# Patient Record
Sex: FEMALE | Race: BLACK OR AFRICAN AMERICAN | NOT HISPANIC OR LATINO | URBAN - METROPOLITAN AREA
[De-identification: names, ages, dates, MRNs, and addresses within clinical notes are randomized per-mention and may not be internally consistent; named-entity substitution may affect disease eponyms.]

---

## 2021-03-25 ENCOUNTER — OFFICE VISIT (OUTPATIENT)
Dept: URBAN - METROPOLITAN AREA CLINIC 25 | Facility: CLINIC | Age: 31
End: 2021-03-25
Payer: MEDICAID

## 2021-03-25 ENCOUNTER — WEB ENCOUNTER (OUTPATIENT)
Dept: URBAN - METROPOLITAN AREA CLINIC 25 | Facility: CLINIC | Age: 31
End: 2021-03-25

## 2021-03-25 VITALS
DIASTOLIC BLOOD PRESSURE: 87 MMHG | HEART RATE: 72 BPM | WEIGHT: 133.6 LBS | TEMPERATURE: 97.5 F | HEIGHT: 67 IN | BODY MASS INDEX: 20.97 KG/M2 | SYSTOLIC BLOOD PRESSURE: 126 MMHG

## 2021-03-25 DIAGNOSIS — K92.0 HEMATEMESIS, PRESENCE OF NAUSEA NOT SPECIFIED: ICD-10-CM

## 2021-03-25 DIAGNOSIS — R10.30 LOWER ABDOMINAL PAIN: ICD-10-CM

## 2021-03-25 DIAGNOSIS — R11.2 INTRACTABLE VOMITING WITH NAUSEA, UNSPECIFIED VOMITING TYPE: ICD-10-CM

## 2021-03-25 PROCEDURE — 99204 OFFICE O/P NEW MOD 45 MIN: CPT | Performed by: INTERNAL MEDICINE

## 2021-03-25 RX ORDER — OMEPRAZOLE 40 MG/1
1 CAPSULE 30 MINUTES BEFORE MORNING MEAL CAPSULE, DELAYED RELEASE PELLETS ORAL ONCE A DAY
Qty: 90 | Refills: 4 | OUTPATIENT
Start: 2021-03-25

## 2021-03-25 RX ORDER — HYOSCYAMINE SULFATE 0.12 MG/1
TAKE 1 TABLET (0.125 MG) BY ORAL ROUTE 3 TIMES PER DAY FOR 30 DAYS TABLET ORAL
Qty: 90 | Refills: 4 | OUTPATIENT
Start: 2021-03-25 | End: 2021-08-22

## 2021-03-25 NOTE — HPI-TODAY'S VISIT:
Patient presents with c/o abdominal pain with n/v intermittently. Pt's ER visit in Feb at [facility] resulted in normal labs /x rays. Pt was discharged from the er. She has had constipation. Pt admits to intermittent heartburn. Pt denies hot showers and heating pad. Pt states sxs have been present for yrs intermittently without diagnosis. Pt admits to hematemesis.

## 2021-04-21 ENCOUNTER — OFFICE VISIT (OUTPATIENT)
Dept: URBAN - METROPOLITAN AREA SURGERY CENTER 20 | Facility: SURGERY CENTER | Age: 31
End: 2021-04-21
Payer: MEDICAID

## 2021-04-21 DIAGNOSIS — K92.0 BLOODY EMESIS: ICD-10-CM

## 2021-04-21 DIAGNOSIS — K31.89 ACQUIRED DEFORMITY OF DUODENUM: ICD-10-CM

## 2021-04-21 DIAGNOSIS — R11.2 ACUTE NAUSEA WITH NONBILIOUS VOMITING: ICD-10-CM

## 2021-04-21 PROCEDURE — 43235 EGD DIAGNOSTIC BRUSH WASH: CPT | Performed by: INTERNAL MEDICINE

## 2021-04-21 PROCEDURE — G8907 PT DOC NO EVENTS ON DISCHARG: HCPCS | Performed by: INTERNAL MEDICINE

## 2021-04-21 RX ORDER — OMEPRAZOLE 40 MG/1
1 CAPSULE 30 MINUTES BEFORE MORNING MEAL CAPSULE, DELAYED RELEASE PELLETS ORAL ONCE A DAY
Qty: 90 | Refills: 4 | Status: ACTIVE | COMMUNITY
Start: 2021-03-25

## 2021-04-21 RX ORDER — HYOSCYAMINE SULFATE 0.12 MG/1
TAKE 1 TABLET (0.125 MG) BY ORAL ROUTE 3 TIMES PER DAY FOR 30 DAYS TABLET ORAL
Qty: 90 | Refills: 4 | Status: ACTIVE | COMMUNITY
Start: 2021-03-25 | End: 2021-08-22

## 2021-05-24 ENCOUNTER — TELEPHONE ENCOUNTER (OUTPATIENT)
Dept: URBAN - METROPOLITAN AREA CLINIC 23 | Facility: CLINIC | Age: 31
End: 2021-05-24

## 2021-06-01 ENCOUNTER — OFFICE VISIT (OUTPATIENT)
Dept: URBAN - METROPOLITAN AREA CLINIC 92 | Facility: CLINIC | Age: 31
End: 2021-06-01
Payer: MEDICAID

## 2021-06-01 ENCOUNTER — LAB OUTSIDE AN ENCOUNTER (OUTPATIENT)
Dept: URBAN - METROPOLITAN AREA CLINIC 92 | Facility: CLINIC | Age: 31
End: 2021-06-01

## 2021-06-01 ENCOUNTER — TELEPHONE ENCOUNTER (OUTPATIENT)
Dept: URBAN - METROPOLITAN AREA CLINIC 23 | Facility: CLINIC | Age: 31
End: 2021-06-01

## 2021-06-01 DIAGNOSIS — Z09 FOLLOW UP: ICD-10-CM

## 2021-06-01 DIAGNOSIS — R19.7 DIARRHEA: ICD-10-CM

## 2021-06-01 DIAGNOSIS — R19.5 STOOL MUCUS: ICD-10-CM

## 2021-06-01 DIAGNOSIS — R11.2 NAUSEA & VOMITING: ICD-10-CM

## 2021-06-01 PROCEDURE — 99214 OFFICE O/P EST MOD 30 MIN: CPT | Performed by: INTERNAL MEDICINE

## 2021-06-01 RX ORDER — POLYETHYLENE GLYCOL 3350, SODIUM SULFATE, SODIUM CHLORIDE, POTASSIUM CHLORIDE, ASCORBIC ACID, SODIUM ASCORBATE 140-9-5.2G
1 KIT KIT ORAL ONCE
Qty: 1 | Refills: 1 | OUTPATIENT
Start: 2021-06-01 | End: 2021-06-03

## 2021-06-01 RX ORDER — HYOSCYAMINE SULFATE 0.12 MG/1
TAKE 1 TABLET (0.125 MG) BY ORAL ROUTE 3 TIMES PER DAY FOR 30 DAYS TABLET ORAL
Qty: 90 | Refills: 4 | Status: ACTIVE | COMMUNITY
Start: 2021-03-25 | End: 2021-08-22

## 2021-06-01 RX ORDER — ONDANSETRON HYDROCHLORIDE 4 MG/1
1 TABLET TABLET, FILM COATED ORAL
Qty: 21 | Refills: 0 | OUTPATIENT
Start: 2021-06-01

## 2021-06-01 RX ORDER — OMEPRAZOLE 40 MG/1
1 CAPSULE 30 MINUTES BEFORE MORNING MEAL CAPSULE, DELAYED RELEASE PELLETS ORAL ONCE A DAY
Qty: 90 | Refills: 4 | Status: ACTIVE | COMMUNITY
Start: 2021-03-25

## 2021-06-01 NOTE — HPI-TODAY'S VISIT:
Pt Liam is here for evaluation of diarrhea. . Today on 6/1/2021, pt reports that she was discharged from hospital ([facility]) for severe abdominal pain.  She has severe nausea/vomiting and weight loss.  She is very emotional regarding the issues.  She has long term constipation and has chronic hemorrhoids with significant amount of swelling.  She takes Dulcolax daily, which helps her to have a BM.  She was discharged on bentyl/Zofran/Prilosec.  Has a lot of nausea and vomiting and has some blood on occasion.   having some mucus as well as cramping abd pain . PMH: h/o possible c diff resolved w/o tx . 4/21/2021 with Dr. Gamboa: The examined esophagus was normal. Findings: The Z-line was regular and was found 37 cm from the incisors. Erythematous mucosa was found in the stomach. The cardia and gastric fundus were normal on retroflexion. The examined duodenum was normal.

## 2021-06-01 NOTE — PHYSICAL EXAM HENT:
Head,  normocephalic,  atraumatic,  Face,  Face within normal limits,  Ears,  External ears within normal limits,  Nose/Nasopharynx,  External nose  normal appearance,  nares patent,  no nasal discharge,  Mouth and Throat,  Oral cavity appearance normal,  Breath odor normal,  Lips,  Appearance normal
Chest pain

## 2021-06-04 ENCOUNTER — TELEPHONE ENCOUNTER (OUTPATIENT)
Dept: URBAN - METROPOLITAN AREA CLINIC 98 | Facility: CLINIC | Age: 31
End: 2021-06-04

## 2021-06-04 RX ORDER — POLYETHYLENE GLYCOL 3350, SODIUM SULFATE ANHYDROUS, SODIUM BICARBONATE, SODIUM CHLORIDE, POTASSIUM CHLORIDE 236; 22.74; 6.74; 5.86; 2.97 G/4L; G/4L; G/4L; G/4L; G/4L
1 BOTTLE POWDER, FOR SOLUTION ORAL ONCE
Qty: 1 KIT | Refills: 0 | OUTPATIENT
Start: 2021-06-11 | End: 2021-06-12

## 2021-06-07 ENCOUNTER — OFFICE VISIT (OUTPATIENT)
Dept: URBAN - METROPOLITAN AREA SURGERY CENTER 18 | Facility: SURGERY CENTER | Age: 31
End: 2021-06-07
Payer: MEDICAID

## 2021-06-07 ENCOUNTER — TELEPHONE ENCOUNTER (OUTPATIENT)
Dept: URBAN - METROPOLITAN AREA SURGERY CENTER 30 | Facility: SURGERY CENTER | Age: 31
End: 2021-06-07

## 2021-06-07 DIAGNOSIS — K52.89 OTHER AND UNSPECIFIED NONINFECTIOUS GASTROENTERITIS AND COLITIS: ICD-10-CM

## 2021-06-07 PROCEDURE — 45380 COLONOSCOPY AND BIOPSY: CPT | Performed by: INTERNAL MEDICINE

## 2021-06-07 PROCEDURE — G8907 PT DOC NO EVENTS ON DISCHARG: HCPCS | Performed by: INTERNAL MEDICINE

## 2021-06-07 RX ORDER — ONDANSETRON HYDROCHLORIDE 4 MG/1
1 TABLET TABLET, FILM COATED ORAL
Qty: 21 | Refills: 0 | Status: ACTIVE | COMMUNITY
Start: 2021-06-01

## 2021-06-07 RX ORDER — HYOSCYAMINE SULFATE 0.12 MG/1
TAKE 1 TABLET (0.125 MG) BY ORAL ROUTE 3 TIMES PER DAY FOR 30 DAYS TABLET ORAL
Qty: 90 | Refills: 4 | Status: ACTIVE | COMMUNITY
Start: 2021-03-25 | End: 2021-08-22

## 2021-06-07 RX ORDER — OMEPRAZOLE 40 MG/1
1 CAPSULE 30 MINUTES BEFORE MORNING MEAL CAPSULE, DELAYED RELEASE PELLETS ORAL ONCE A DAY
Qty: 90 | Refills: 4 | Status: ACTIVE | COMMUNITY
Start: 2021-03-25

## 2021-06-10 ENCOUNTER — TELEPHONE ENCOUNTER (OUTPATIENT)
Dept: URBAN - METROPOLITAN AREA CLINIC 98 | Facility: CLINIC | Age: 31
End: 2021-06-10

## 2021-06-25 ENCOUNTER — OFFICE VISIT (OUTPATIENT)
Dept: URBAN - METROPOLITAN AREA TELEHEALTH 2 | Facility: TELEHEALTH | Age: 31
End: 2021-06-25
Payer: MEDICAID

## 2021-06-25 DIAGNOSIS — R19.7 DIARRHEA: ICD-10-CM

## 2021-06-25 DIAGNOSIS — Z09 FOLLOW UP: ICD-10-CM

## 2021-06-25 DIAGNOSIS — K59.01 CONSTIPATION: ICD-10-CM

## 2021-06-25 DIAGNOSIS — K58.2 IRRITABLE BOWEL SYNDROME WITH BOTH CONSTIPATION AND DIARRHEA: ICD-10-CM

## 2021-06-25 DIAGNOSIS — R19.5 STOOL MUCUS: ICD-10-CM

## 2021-06-25 PROCEDURE — 99214 OFFICE O/P EST MOD 30 MIN: CPT | Performed by: INTERNAL MEDICINE

## 2021-06-25 RX ORDER — METRONIDAZOLE 500 MG/1
1 TABLET TABLET, FILM COATED ORAL
Qty: 28 TABLET | Refills: 0 | OUTPATIENT
Start: 2021-06-25 | End: 2021-07-09

## 2021-06-25 RX ORDER — OMEPRAZOLE 40 MG/1
1 CAPSULE 30 MINUTES BEFORE MORNING MEAL CAPSULE, DELAYED RELEASE PELLETS ORAL ONCE A DAY
Qty: 90 | Refills: 4 | Status: ACTIVE | COMMUNITY
Start: 2021-03-25

## 2021-06-25 RX ORDER — HYOSCYAMINE SULFATE 0.12 MG/1
TAKE 1 TABLET (0.125 MG) BY ORAL ROUTE 3 TIMES PER DAY FOR 30 DAYS TABLET ORAL
Qty: 90 | Refills: 4 | Status: ACTIVE | COMMUNITY
Start: 2021-03-25 | End: 2021-08-22

## 2021-06-25 RX ORDER — ONDANSETRON HYDROCHLORIDE 4 MG/1
1 TABLET TABLET, FILM COATED ORAL
Qty: 21 | Refills: 0 | Status: ACTIVE | COMMUNITY
Start: 2021-06-01

## 2021-06-25 RX ORDER — CIPROFLOXACIN HYDROCHLORIDE 500 MG/1
1 TABLET TABLET, FILM COATED ORAL
Qty: 28 TABLET | Refills: 0 | OUTPATIENT
Start: 2021-06-25 | End: 2021-07-09

## 2021-06-25 RX ORDER — PANTOPRAZOLE SODIUM 40 MG/1
1 TABLET TABLET, DELAYED RELEASE ORAL ONCE A DAY
Qty: 30 TABLET | Refills: 11 | OUTPATIENT
Start: 2021-06-25

## 2021-06-25 NOTE — HPI-TODAY'S VISIT:
Pt Liam is here for evaluation of diarrhea. . Previously on 6/1/2021, pt reports that she was discharged from hospital ([facility]) for severe abdominal pain.  She has severe nausea/vomiting and weight loss.  She is very emotional regarding the issues.  She has long term constipation and has chronic hemorrhoids with significant amount of swelling.  She takes Dulcolax daily, which helps her to have a BM.  She was discharged on bentyl/Zofran/Prilosec.  Has a lot of nausea and vomiting and has some blood on occasion.   having some mucus as well as cramping abd pain . Today on 6/25/21, pt reports that her vomiting has improved; eating more than she used to; has been taking laxatives, which are improving her bowel function (every 3 days).  She still has having abdominal pain, diffuse in nature, nothing makes it better or worse.  Having mucus in stool, but no blood.  Does not take NSAID. . PMH: h/o possible c diff resolved w/o tx . 4/21/2021 with Dr. Gamboa: The examined esophagus was normal. Findings: The Z-line was regular and was found 37 cm from the incisors. Erythematous mucosa was found in the stomach. The cardia and gastric fundus were normal on retroflexion. The examined duodenum was normal. . Colonoscopy  6/2021: Diffuse inflammation, moderate in severity and characterized by friability, granularity and aphthous ulcerations was found in the terminal ileum. Biopsies were taken with a cold forceps for histology. The pathology specimen was placed into Bottle Number 1. Mild stenosis was also seen. Findings: : Inflammation The colon (entire examined portion) appeared normal. Biopsies were taken with a cold forceps for histology. The pathology specimen was placed into Bottle Number 2. . Final Pathologic Diagnosis A Terminal ileum, biopsy Focal active ileitis. No granulomas seen. See comment. COMMENT: The ileal biopsy reveals acute inflammatory cells within villous epithelium. Changes such as these may be seen with ischemic enteritis, viruses, bacterial infections, following NSAID use, and rarely idiopathic inflammatory bowel disease. Clinical, endoscopic and pathologic correlation is required. B Random colon, biopsy Colonic mucosa with no significant histopathology. No histologic evidence of active, chronic or microscopic colitis.

## 2021-06-25 NOTE — PHYSICAL EXAM HENT:
Head,  normocephalic,  atraumatic,  Face,  Face within normal limits,  Ears,  External ears within normal limits,  Nose/Nasopharynx,  External nose  normal appearance,  nares patent,  no nasal discharge,  Mouth and Throat,  Oral cavity appearance normal,  Breath odor normal,  Lips,  Appearance normal according to MD pt has poor prognosis and family is considering comfort measures

## 2021-07-06 ENCOUNTER — TELEPHONE ENCOUNTER (OUTPATIENT)
Dept: URBAN - METROPOLITAN AREA CLINIC 92 | Facility: CLINIC | Age: 31
End: 2021-07-06

## 2021-07-06 RX ORDER — METRONIDAZOLE 500 MG/1
1 TABLET TABLET, FILM COATED ORAL
Qty: 28 TABLET | Refills: 0
Start: 2021-06-25 | End: 2021-07-20

## 2021-07-06 RX ORDER — CIPROFLOXACIN HYDROCHLORIDE 500 MG/1
1 TABLET TABLET, FILM COATED ORAL
Qty: 28 TABLET | Refills: 0
Start: 2021-06-25 | End: 2021-07-20

## 2021-07-06 RX ORDER — PANTOPRAZOLE SODIUM 40 MG/1
1 TABLET TABLET, DELAYED RELEASE ORAL ONCE A DAY
Qty: 30 TABLET | Refills: 11
Start: 2021-06-25

## 2021-07-08 ENCOUNTER — TELEPHONE ENCOUNTER (OUTPATIENT)
Dept: URBAN - METROPOLITAN AREA CLINIC 92 | Facility: CLINIC | Age: 31
End: 2021-07-08

## 2021-07-08 RX ORDER — PANTOPRAZOLE SODIUM 40 MG/1
1 TABLET TABLET, DELAYED RELEASE ORAL ONCE A DAY
Qty: 30 TABLET | Refills: 11
Start: 2021-06-25

## 2021-07-08 RX ORDER — CIPROFLOXACIN HYDROCHLORIDE 500 MG/1
1 TABLET TABLET, FILM COATED ORAL
Qty: 28 TABLET | Refills: 0
Start: 2021-06-25 | End: 2021-07-22

## 2021-07-08 RX ORDER — METRONIDAZOLE 500 MG/1
1 TABLET TABLET, FILM COATED ORAL
Qty: 28 TABLET | Refills: 0
Start: 2021-06-25 | End: 2021-07-22

## 2021-07-29 ENCOUNTER — TELEPHONE ENCOUNTER (OUTPATIENT)
Dept: URBAN - METROPOLITAN AREA CLINIC 92 | Facility: CLINIC | Age: 31
End: 2021-07-29

## 2021-07-30 ENCOUNTER — LAB OUTSIDE AN ENCOUNTER (OUTPATIENT)
Dept: URBAN - METROPOLITAN AREA CLINIC 92 | Facility: CLINIC | Age: 31
End: 2021-07-30

## 2021-08-10 ENCOUNTER — TELEPHONE ENCOUNTER (OUTPATIENT)
Dept: URBAN - METROPOLITAN AREA CLINIC 92 | Facility: CLINIC | Age: 31
End: 2021-08-10

## 2021-08-10 RX ORDER — CIPROFLOXACIN HYDROCHLORIDE 500 MG/1
1 TABLET TABLET, FILM COATED ORAL
Qty: 28 TABLET | Refills: 0
Start: 2021-06-25 | End: 2021-08-24

## 2021-08-10 RX ORDER — METRONIDAZOLE 500 MG/1
1 TABLET TABLET, FILM COATED ORAL
Qty: 28 TABLET | Refills: 0
Start: 2021-06-25 | End: 2021-08-24

## 2021-08-12 ENCOUNTER — TELEPHONE ENCOUNTER (OUTPATIENT)
Dept: URBAN - METROPOLITAN AREA CLINIC 92 | Facility: CLINIC | Age: 31
End: 2021-08-12

## 2021-08-13 ENCOUNTER — OFFICE VISIT (OUTPATIENT)
Dept: URBAN - METROPOLITAN AREA TELEHEALTH 2 | Facility: TELEHEALTH | Age: 31
End: 2021-08-13
Payer: MEDICAID

## 2021-08-13 DIAGNOSIS — K59.01 CONSTIPATION: ICD-10-CM

## 2021-08-13 PROCEDURE — 992 APS NON BILLABLE: Performed by: INTERNAL MEDICINE

## 2021-08-13 RX ORDER — ONDANSETRON HYDROCHLORIDE 4 MG/1
1 TABLET TABLET, FILM COATED ORAL
Qty: 21 | Refills: 0 | Status: ACTIVE | COMMUNITY
Start: 2021-06-01

## 2021-08-13 RX ORDER — OMEPRAZOLE 40 MG/1
1 CAPSULE 30 MINUTES BEFORE MORNING MEAL CAPSULE, DELAYED RELEASE PELLETS ORAL ONCE A DAY
Qty: 90 | Refills: 4 | Status: ACTIVE | COMMUNITY
Start: 2021-03-25

## 2021-08-13 RX ORDER — METRONIDAZOLE 500 MG/1
1 TABLET TABLET, FILM COATED ORAL
Qty: 28 TABLET | Refills: 0 | Status: ACTIVE | COMMUNITY
Start: 2021-06-25 | End: 2021-08-24

## 2021-08-13 RX ORDER — PANTOPRAZOLE SODIUM 40 MG/1
1 TABLET TABLET, DELAYED RELEASE ORAL ONCE A DAY
Qty: 30 TABLET | Refills: 11 | Status: ACTIVE | COMMUNITY
Start: 2021-06-25

## 2021-08-13 RX ORDER — HYOSCYAMINE SULFATE 0.12 MG/1
TAKE 1 TABLET (0.125 MG) BY ORAL ROUTE 3 TIMES PER DAY FOR 30 DAYS TABLET ORAL
Qty: 90 | Refills: 4 | Status: ACTIVE | COMMUNITY
Start: 2021-03-25 | End: 2021-08-22

## 2021-08-13 RX ORDER — CIPROFLOXACIN HYDROCHLORIDE 500 MG/1
1 TABLET TABLET, FILM COATED ORAL
Qty: 28 TABLET | Refills: 0 | Status: ACTIVE | COMMUNITY
Start: 2021-06-25 | End: 2021-08-24

## 2021-08-13 NOTE — HPI-TODAY'S VISIT:
NO SHOW; PHONE NUMBER NOT IN SERVICE . Pt Liam is here for evaluation of diarrhea. . Previously on 6/1/2021, pt reports that she was discharged from hospital ([facility]) for severe abdominal pain.  She has severe nausea/vomiting and weight loss.  She is very emotional regarding the issues.  She has long term constipation and has chronic hemorrhoids with significant amount of swelling.  She takes Dulcolax daily, which helps her to have a BM.  She was discharged on bentyl/Zofran/Prilosec.  Has a lot of nausea and vomiting and has some blood on occasion.   having some mucus as well as cramping abd pain . Today on 6/25/21, pt reports that her vomiting has improved; eating more than she used to; has been taking laxatives, which are improving her bowel function (every 3 days).  She still has having abdominal pain, diffuse in nature, nothing makes it better or worse.  Having mucus in stool, but no blood.  Does not take NSAID. . PMH: h/o possible c diff resolved w/o tx . 4/21/2021 with Dr. Gamboa: The examined esophagus was normal. Findings: The Z-line was regular and was found 37 cm from the incisors. Erythematous mucosa was found in the stomach. The cardia and gastric fundus were normal on retroflexion. The examined duodenum was normal. . Colonoscopy  6/2021: Diffuse inflammation, moderate in severity and characterized by friability, granularity and aphthous ulcerations was found in the terminal ileum. Biopsies were taken with a cold forceps for histology. The pathology specimen was placed into Bottle Number 1. Mild stenosis was also seen. Findings: : Inflammation The colon (entire examined portion) appeared normal. Biopsies were taken with a cold forceps for histology. The pathology specimen was placed into Bottle Number 2. . Final Pathologic Diagnosis A Terminal ileum, biopsy Focal active ileitis. No granulomas seen. See comment. COMMENT: The ileal biopsy reveals acute inflammatory cells within villous epithelium. Changes such as these may be seen with ischemic enteritis, viruses, bacterial infections, following NSAID use, and rarely idiopathic inflammatory bowel disease. Clinical, endoscopic and pathologic correlation is required. B Random colon, biopsy Colonic mucosa with no significant histopathology. No histologic evidence of active, chronic or microscopic colitis.

## 2021-08-23 ENCOUNTER — OFFICE VISIT (OUTPATIENT)
Dept: URBAN - METROPOLITAN AREA TELEHEALTH 2 | Facility: TELEHEALTH | Age: 31
End: 2021-08-23
Payer: MEDICAID

## 2021-08-23 ENCOUNTER — TELEPHONE ENCOUNTER (OUTPATIENT)
Dept: URBAN - METROPOLITAN AREA CLINIC 92 | Facility: CLINIC | Age: 31
End: 2021-08-23

## 2021-08-23 ENCOUNTER — LAB OUTSIDE AN ENCOUNTER (OUTPATIENT)
Dept: URBAN - METROPOLITAN AREA TELEHEALTH 2 | Facility: TELEHEALTH | Age: 31
End: 2021-08-23

## 2021-08-23 DIAGNOSIS — R10.84 ABDOMINAL CRAMPING, GENERALIZED: ICD-10-CM

## 2021-08-23 DIAGNOSIS — K52.3 INDETERMINATE COLITIS: ICD-10-CM

## 2021-08-23 DIAGNOSIS — K59.01 CONSTIPATION: ICD-10-CM

## 2021-08-23 PROCEDURE — 99204 OFFICE O/P NEW MOD 45 MIN: CPT | Performed by: INTERNAL MEDICINE

## 2021-08-23 PROCEDURE — 99214 OFFICE O/P EST MOD 30 MIN: CPT | Performed by: INTERNAL MEDICINE

## 2021-08-23 RX ORDER — ONDANSETRON HYDROCHLORIDE 4 MG/1
1 TABLET TABLET, FILM COATED ORAL
Qty: 21 | Refills: 0 | COMMUNITY
Start: 2021-06-01

## 2021-08-23 RX ORDER — CIPROFLOXACIN HYDROCHLORIDE 500 MG/1
1 TABLET TABLET, FILM COATED ORAL
Qty: 28 TABLET | Refills: 0 | COMMUNITY
Start: 2021-06-25 | End: 2021-08-24

## 2021-08-23 RX ORDER — OMEPRAZOLE 40 MG/1
1 CAPSULE 30 MINUTES BEFORE MORNING MEAL CAPSULE, DELAYED RELEASE PELLETS ORAL ONCE A DAY
Qty: 90 | Refills: 4 | COMMUNITY
Start: 2021-03-25

## 2021-08-23 RX ORDER — NORTRIPTYLINE HYDROCHLORIDE 10 MG/1
2 CAPSULE CAPSULE ORAL ONCE A DAY
Qty: 60 | Refills: 11 | OUTPATIENT
Start: 2021-08-23

## 2021-08-23 RX ORDER — PANTOPRAZOLE SODIUM 40 MG/1
1 TABLET TABLET, DELAYED RELEASE ORAL ONCE A DAY
Qty: 30 TABLET | Refills: 11 | COMMUNITY
Start: 2021-06-25

## 2021-08-23 RX ORDER — METRONIDAZOLE 500 MG/1
1 TABLET TABLET, FILM COATED ORAL
Qty: 28 TABLET | Refills: 0 | COMMUNITY
Start: 2021-06-25 | End: 2021-08-24

## 2021-08-23 RX ORDER — HYOSCYAMINE SULFATE 0.12 MG/1
1 TABLET AS NEEDED TABLET ORAL
Qty: 120 | Refills: 3 | OUTPATIENT
Start: 2021-08-23 | End: 2021-12-20

## 2021-08-23 NOTE — HPI-TODAY'S VISIT:
. Pt Liam is here for evaluation of diarrhea. . Previously on 6/1/2021, pt reports that she was discharged from hospital ([facility]) for severe abdominal pain.  She has severe nausea/vomiting and weight loss.  She is very emotional regarding the issues.  She has long term constipation and has chronic hemorrhoids with significant amount of swelling.  She takes Dulcolax daily, which helps her to have a BM.  She was discharged on bentyl/Zofran/Prilosec.  Has a lot of nausea and vomiting and has some blood on occasion.   having some mucus as well as cramping abd pain . Previously on 6/25/21, pt reports that her vomiting has improved; eating more than she used to; has been taking laxatives, which are improving her bowel function (every 3 days).  She still has having abdominal pain, diffuse in nature, nothing makes it better or worse.  Having mucus in stool, but no blood.  Does not take NSAID. . Today on 8/23/2021, pt reports that she is having more stomach pain, still having constipation, which has improved with laxatives and supplements.  No longer having as much degree of vomiting as prior. . PMH: h/o possible c diff resolved w/o tx . 4/21/2021 with Dr. Gamboa: The examined esophagus was normal. Findings: The Z-line was regular and was found 37 cm from the incisors. Erythematous mucosa was found in the stomach. The cardia and gastric fundus were normal on retroflexion. The examined duodenum was normal. . Colonoscopy  6/2021: Diffuse inflammation, moderate in severity and characterized by friability, granularity and aphthous ulcerations was found in the terminal ileum. Biopsies were taken with a cold forceps for histology. The pathology specimen was placed into Bottle Number 1. Mild stenosis was also seen. Findings: : Inflammation The colon (entire examined portion) appeared normal. Biopsies were taken with a cold forceps for histology. The pathology specimen was placed into Bottle Number 2. . Final Pathologic Diagnosis A Terminal ileum, biopsy Focal active ileitis. No granulomas seen. See comment. COMMENT: The ileal biopsy reveals acute inflammatory cells within villous epithelium. Changes such as these may be seen with ischemic enteritis, viruses, bacterial infections, following NSAID use, and rarely idiopathic inflammatory bowel disease. Clinical, endoscopic and pathologic correlation is required. B Random colon, biopsy Colonic mucosa with no significant histopathology. No histologic evidence of active, chronic or microscopic colitis. .

## 2021-08-24 ENCOUNTER — TELEPHONE ENCOUNTER (OUTPATIENT)
Dept: URBAN - METROPOLITAN AREA CLINIC 92 | Facility: CLINIC | Age: 31
End: 2021-08-24

## 2021-08-24 RX ORDER — LINACLOTIDE 72 UG/1
1 CAPSULE AT LEAST 30 MINUTES BEFORE THE FIRST MEAL OF THE DAY ON AN EMPTY STOMACH CAPSULE, GELATIN COATED ORAL ONCE A DAY
Qty: 90 | Refills: 4 | OUTPATIENT
Start: 2021-08-26 | End: 2022-11-18

## 2021-08-31 ENCOUNTER — TELEPHONE ENCOUNTER (OUTPATIENT)
Dept: URBAN - METROPOLITAN AREA CLINIC 92 | Facility: CLINIC | Age: 31
End: 2021-08-31

## 2021-09-03 ENCOUNTER — OFFICE VISIT (OUTPATIENT)
Dept: URBAN - METROPOLITAN AREA TELEHEALTH 2 | Facility: TELEHEALTH | Age: 31
End: 2021-09-03
Payer: MEDICAID

## 2021-09-03 ENCOUNTER — TELEPHONE ENCOUNTER (OUTPATIENT)
Dept: URBAN - METROPOLITAN AREA CLINIC 92 | Facility: CLINIC | Age: 31
End: 2021-09-03

## 2021-09-03 ENCOUNTER — LAB OUTSIDE AN ENCOUNTER (OUTPATIENT)
Dept: URBAN - METROPOLITAN AREA TELEHEALTH 2 | Facility: TELEHEALTH | Age: 31
End: 2021-09-03

## 2021-09-03 DIAGNOSIS — R93.5 ABNORMAL ABDOMINAL CT SCAN: ICD-10-CM

## 2021-09-03 DIAGNOSIS — K59.01 CONSTIPATION: ICD-10-CM

## 2021-09-03 DIAGNOSIS — R10.84 ABDOMINAL CRAMPING, GENERALIZED: ICD-10-CM

## 2021-09-03 DIAGNOSIS — K52.3 INDETERMINATE COLITIS: ICD-10-CM

## 2021-09-03 PROCEDURE — 99214 OFFICE O/P EST MOD 30 MIN: CPT | Performed by: INTERNAL MEDICINE

## 2021-09-03 RX ORDER — POLYETHYLENE GLYCOL 3350, SODIUM SULFATE, SODIUM CHLORIDE, POTASSIUM CHLORIDE, ASCORBIC ACID, SODIUM ASCORBATE 140-9-5.2G
1 KIT KIT ORAL ONCE
Qty: 1 | Refills: 1 | OUTPATIENT
Start: 2021-09-03 | End: 2021-09-05

## 2021-09-03 RX ORDER — ONDANSETRON HYDROCHLORIDE 4 MG/1
1 TABLET TABLET, FILM COATED ORAL
Qty: 21 | Refills: 0 | COMMUNITY
Start: 2021-06-01

## 2021-09-03 RX ORDER — HYOSCYAMINE SULFATE 0.12 MG/1
1 TABLET AS NEEDED TABLET ORAL
Qty: 120 | Refills: 3 | Status: ACTIVE | COMMUNITY
Start: 2021-08-23 | End: 2021-12-20

## 2021-09-03 RX ORDER — NORTRIPTYLINE HYDROCHLORIDE 10 MG/1
2 CAPSULE CAPSULE ORAL ONCE A DAY
Qty: 60 | Refills: 11 | Status: ACTIVE | COMMUNITY
Start: 2021-08-23

## 2021-09-03 RX ORDER — NORTRIPTYLINE HYDROCHLORIDE 10 MG/1
2 CAPSULE CAPSULE ORAL ONCE A DAY
Qty: 60 | Refills: 11 | OUTPATIENT

## 2021-09-03 RX ORDER — PANTOPRAZOLE SODIUM 40 MG/1
1 TABLET TABLET, DELAYED RELEASE ORAL ONCE A DAY
Qty: 30 TABLET | Refills: 11 | COMMUNITY
Start: 2021-06-25

## 2021-09-03 RX ORDER — OMEPRAZOLE 40 MG/1
1 CAPSULE 30 MINUTES BEFORE MORNING MEAL CAPSULE, DELAYED RELEASE PELLETS ORAL ONCE A DAY
Qty: 90 | Refills: 4 | COMMUNITY
Start: 2021-03-25

## 2021-09-03 RX ORDER — LINACLOTIDE 145 UG/1
1 CAPSULE AT LEAST 30 MINUTES BEFORE THE FIRST MEAL OF THE DAY ON AN EMPTY STOMACH CAPSULE, GELATIN COATED ORAL ONCE A DAY
Qty: 90 | Refills: 4 | OUTPATIENT
Start: 2021-09-03 | End: 2022-11-26

## 2021-09-03 RX ORDER — PREDNISONE 10 MG/1
1 TABLET TABLET ORAL ONCE A DAY
Qty: 30 TABLET | Refills: 0 | OUTPATIENT
Start: 2021-09-03 | End: 2021-10-03

## 2021-09-03 RX ORDER — LINACLOTIDE 72 UG/1
1 CAPSULE AT LEAST 30 MINUTES BEFORE THE FIRST MEAL OF THE DAY ON AN EMPTY STOMACH CAPSULE, GELATIN COATED ORAL ONCE A DAY
Qty: 90 | Refills: 4 | Status: ACTIVE | COMMUNITY
Start: 2021-08-26 | End: 2022-11-18

## 2021-09-03 RX ORDER — DICYCLOMINE HYDROCHLORIDE 10 MG/1
2 CAPSULES CAPSULE ORAL THREE TIMES A DAY
Qty: 180 | Refills: 3 | OUTPATIENT
Start: 2021-09-03 | End: 2021-12-31

## 2021-09-03 NOTE — HPI-TODAY'S VISIT:
. Pt Liam is here for evaluation of diarrhea. . Previously on 6/1/2021, pt reports that she was discharged from hospital ([facility]) for severe abdominal pain.  She has severe nausea/vomiting and weight loss.  She is very emotional regarding the issues.  She has long term constipation and has chronic hemorrhoids with significant amount of swelling.  She takes Dulcolax daily, which helps her to have a BM.  She was discharged on bentyl/Zofran/Prilosec.  Has a lot of nausea and vomiting and has some blood on occasion.   having some mucus as well as cramping abd pain . Previously on 6/25/21, pt reports that her vomiting has improved; eating more than she used to; has been taking laxatives, which are improving her bowel function (every 3 days).  She still has having abdominal pain, diffuse in nature, nothing makes it better or worse.  Having mucus in stool, but no blood.  Does not take NSAID. . Previously on 8/23/2021, pt reports that she is having more stomach pain, still having constipation, which has improved with laxatives and supplements.  No longer having as much degree of vomiting as prior. . Today on 9/3/2021, pt reports that she was hospitalized for severe abdominal pain.  Her CT scan showed lot of stool in the colon.   . PMH: h/o possible c diff resolved w/o tx . 4/21/2021 with Dr. Gamboa: The examined esophagus was normal. Findings: The Z-line was regular and was found 37 cm from the incisors. Erythematous mucosa was found in the stomach. The cardia and gastric fundus were normal on retroflexion. The examined duodenum was normal. . Colonoscopy  6/2021: Diffuse inflammation, moderate in severity and characterized by friability, granularity and aphthous ulcerations was found in the terminal ileum. Biopsies were taken with a cold forceps for histology. The pathology specimen was placed into Bottle Number 1. Mild stenosis was also seen. Findings: : Inflammation The colon (entire examined portion) appeared normal. Biopsies were taken with a cold forceps for histology. The pathology specimen was placed into Bottle Number 2. . Final Pathologic Diagnosis A Terminal ileum, biopsy Focal active ileitis. No granulomas seen. See comment. COMMENT: The ileal biopsy reveals acute inflammatory cells within villous epithelium. Changes such as these may be seen with ischemic enteritis, viruses, bacterial infections, following NSAID use, and rarely idiopathic inflammatory bowel disease.

## 2021-09-08 ENCOUNTER — TELEPHONE ENCOUNTER (OUTPATIENT)
Dept: URBAN - METROPOLITAN AREA CLINIC 92 | Facility: CLINIC | Age: 31
End: 2021-09-08

## 2021-10-12 ENCOUNTER — TELEPHONE ENCOUNTER (OUTPATIENT)
Dept: URBAN - METROPOLITAN AREA CLINIC 92 | Facility: CLINIC | Age: 31
End: 2021-10-12

## 2021-10-12 RX ORDER — DICYCLOMINE HYDROCHLORIDE 10 MG/1
2 CAPSULES CAPSULE ORAL THREE TIMES A DAY
Qty: 180 | Refills: 3
Start: 2021-09-03 | End: 2022-02-09

## 2021-10-12 RX ORDER — DICYCLOMINE HYDROCHLORIDE 10 MG/1
2 CAPSULES CAPSULE ORAL THREE TIMES A DAY
Qty: 180 | Refills: 3 | OUTPATIENT
Start: 2021-10-12 | End: 2022-02-08

## 2021-10-15 ENCOUNTER — OFFICE VISIT (OUTPATIENT)
Dept: URBAN - METROPOLITAN AREA MEDICAL CENTER 28 | Facility: MEDICAL CENTER | Age: 31
End: 2021-10-15
Payer: MEDICAID

## 2021-10-15 DIAGNOSIS — K63.89 BACTERIAL OVERGROWTH SYNDROME: ICD-10-CM

## 2021-10-15 DIAGNOSIS — K50.00 CICATRIZING ENTEROCOLITIS: ICD-10-CM

## 2021-10-15 DIAGNOSIS — R93.3 ABN FINDINGS-GI TRACT: ICD-10-CM

## 2021-10-15 PROCEDURE — 45380 COLONOSCOPY AND BIOPSY: CPT | Performed by: INTERNAL MEDICINE

## 2021-10-15 RX ORDER — LINACLOTIDE 145 UG/1
1 CAPSULE AT LEAST 30 MINUTES BEFORE THE FIRST MEAL OF THE DAY ON AN EMPTY STOMACH CAPSULE, GELATIN COATED ORAL ONCE A DAY
Qty: 90 | Refills: 4 | Status: ACTIVE | COMMUNITY
Start: 2021-09-03 | End: 2022-11-26

## 2021-10-15 RX ORDER — NORTRIPTYLINE HYDROCHLORIDE 10 MG/1
2 CAPSULE CAPSULE ORAL ONCE A DAY
Qty: 60 | Refills: 11 | Status: ACTIVE | COMMUNITY

## 2021-10-15 RX ORDER — DICYCLOMINE HYDROCHLORIDE 10 MG/1
2 CAPSULES CAPSULE ORAL THREE TIMES A DAY
Qty: 180 | Refills: 3 | Status: ACTIVE | COMMUNITY
Start: 2021-10-12 | End: 2022-02-08

## 2021-10-15 RX ORDER — ONDANSETRON HYDROCHLORIDE 4 MG/1
1 TABLET TABLET, FILM COATED ORAL
Qty: 21 | Refills: 0 | COMMUNITY
Start: 2021-06-01

## 2021-10-15 RX ORDER — OMEPRAZOLE 40 MG/1
1 CAPSULE 30 MINUTES BEFORE MORNING MEAL CAPSULE, DELAYED RELEASE PELLETS ORAL ONCE A DAY
Qty: 90 | Refills: 4 | COMMUNITY
Start: 2021-03-25

## 2021-10-15 RX ORDER — HYOSCYAMINE SULFATE 0.12 MG/1
1 TABLET AS NEEDED TABLET ORAL
Qty: 120 | Refills: 3 | Status: ACTIVE | COMMUNITY
Start: 2021-08-23 | End: 2021-12-20

## 2021-10-15 RX ORDER — PANTOPRAZOLE SODIUM 40 MG/1
1 TABLET TABLET, DELAYED RELEASE ORAL ONCE A DAY
Qty: 30 TABLET | Refills: 11 | COMMUNITY
Start: 2021-06-25

## 2021-10-15 RX ORDER — DICYCLOMINE HYDROCHLORIDE 10 MG/1
2 CAPSULES CAPSULE ORAL THREE TIMES A DAY
Qty: 180 | Refills: 3 | Status: ACTIVE | COMMUNITY
Start: 2021-09-03 | End: 2022-02-09

## 2021-10-15 RX ORDER — LINACLOTIDE 72 UG/1
1 CAPSULE AT LEAST 30 MINUTES BEFORE THE FIRST MEAL OF THE DAY ON AN EMPTY STOMACH CAPSULE, GELATIN COATED ORAL ONCE A DAY
Qty: 90 | Refills: 4 | Status: ACTIVE | COMMUNITY
Start: 2021-08-26 | End: 2022-11-18

## 2021-10-29 ENCOUNTER — TELEPHONE ENCOUNTER (OUTPATIENT)
Dept: URBAN - METROPOLITAN AREA CLINIC 96 | Facility: CLINIC | Age: 31
End: 2021-10-29

## 2021-11-02 ENCOUNTER — OFFICE VISIT (OUTPATIENT)
Dept: URBAN - METROPOLITAN AREA TELEHEALTH 2 | Facility: TELEHEALTH | Age: 31
End: 2021-11-02
Payer: MEDICAID

## 2021-11-02 DIAGNOSIS — K50.80 CROHN'S COLITIS: ICD-10-CM

## 2021-11-02 PROCEDURE — 993 AGA: Performed by: INTERNAL MEDICINE

## 2021-11-02 RX ORDER — DICYCLOMINE HYDROCHLORIDE 10 MG/1
2 CAPSULES CAPSULE ORAL THREE TIMES A DAY
Qty: 180 | Refills: 3 | Status: ACTIVE | COMMUNITY
Start: 2021-10-12 | End: 2022-02-08

## 2021-11-02 RX ORDER — PANTOPRAZOLE SODIUM 40 MG/1
1 TABLET TABLET, DELAYED RELEASE ORAL ONCE A DAY
Qty: 30 TABLET | Refills: 11 | COMMUNITY
Start: 2021-06-25

## 2021-11-02 RX ORDER — HYOSCYAMINE SULFATE 0.12 MG/1
1 TABLET AS NEEDED TABLET ORAL
Qty: 120 | Refills: 3 | Status: ACTIVE | COMMUNITY
Start: 2021-08-23 | End: 2021-12-20

## 2021-11-02 RX ORDER — LINACLOTIDE 72 UG/1
1 CAPSULE AT LEAST 30 MINUTES BEFORE THE FIRST MEAL OF THE DAY ON AN EMPTY STOMACH CAPSULE, GELATIN COATED ORAL ONCE A DAY
Qty: 90 | Refills: 4 | Status: ACTIVE | COMMUNITY
Start: 2021-08-26 | End: 2022-11-18

## 2021-11-02 RX ORDER — ONDANSETRON HYDROCHLORIDE 4 MG/1
1 TABLET TABLET, FILM COATED ORAL
Qty: 21 | Refills: 0 | COMMUNITY
Start: 2021-06-01

## 2021-11-02 RX ORDER — DICYCLOMINE HYDROCHLORIDE 10 MG/1
2 CAPSULES CAPSULE ORAL THREE TIMES A DAY
Qty: 180 | Refills: 3 | Status: ACTIVE | COMMUNITY
Start: 2021-09-03 | End: 2022-02-09

## 2021-11-02 RX ORDER — OMEPRAZOLE 40 MG/1
1 CAPSULE 30 MINUTES BEFORE MORNING MEAL CAPSULE, DELAYED RELEASE PELLETS ORAL ONCE A DAY
Qty: 90 | Refills: 4 | COMMUNITY
Start: 2021-03-25

## 2021-11-02 RX ORDER — LINACLOTIDE 145 UG/1
1 CAPSULE AT LEAST 30 MINUTES BEFORE THE FIRST MEAL OF THE DAY ON AN EMPTY STOMACH CAPSULE, GELATIN COATED ORAL ONCE A DAY
Qty: 90 | Refills: 4 | Status: ACTIVE | COMMUNITY
Start: 2021-09-03 | End: 2022-11-26

## 2021-11-02 RX ORDER — NORTRIPTYLINE HYDROCHLORIDE 10 MG/1
2 CAPSULE CAPSULE ORAL ONCE A DAY
Qty: 60 | Refills: 11 | Status: ACTIVE | COMMUNITY

## 2021-12-06 ENCOUNTER — OFFICE VISIT (OUTPATIENT)
Dept: URBAN - METROPOLITAN AREA TELEHEALTH 2 | Facility: TELEHEALTH | Age: 31
End: 2021-12-06
Payer: MEDICAID

## 2021-12-06 ENCOUNTER — TELEPHONE ENCOUNTER (OUTPATIENT)
Dept: URBAN - METROPOLITAN AREA CLINIC 92 | Facility: CLINIC | Age: 31
End: 2021-12-06

## 2021-12-06 ENCOUNTER — DASHBOARD ENCOUNTERS (OUTPATIENT)
Age: 31
End: 2021-12-06

## 2021-12-06 DIAGNOSIS — K50.80 CROHN'S DISEASE OF BOTH SMALL AND LARGE INTESTINE WITHOUT COMPLICATION: ICD-10-CM

## 2021-12-06 DIAGNOSIS — R10.84 ABDOMINAL PAIN, GENERALIZED: ICD-10-CM

## 2021-12-06 DIAGNOSIS — K59.01 CONSTIPATION: ICD-10-CM

## 2021-12-06 DIAGNOSIS — R93.5 ABNORMAL ABDOMINAL CT SCAN: ICD-10-CM

## 2021-12-06 PROBLEM — 235746007 INDETERMINATE COLITIS: Status: ACTIVE | Noted: 2021-08-23

## 2021-12-06 PROCEDURE — 99215 OFFICE O/P EST HI 40 MIN: CPT | Performed by: INTERNAL MEDICINE

## 2021-12-06 RX ORDER — NORTRIPTYLINE HYDROCHLORIDE 10 MG/1
2 CAPSULE CAPSULE ORAL ONCE A DAY
Qty: 60 | Refills: 11 | Status: ACTIVE | COMMUNITY

## 2021-12-06 RX ORDER — USTEKINUMAB 130 MG/26ML
AS DIRECTED SOLUTION INTRAVENOUS ONCE
Qty: 260 MILLIGRAMS | Refills: 0 | OUTPATIENT
Start: 2021-12-06 | End: 2021-12-07

## 2021-12-06 RX ORDER — ADALIMUMAB 40MG/0.4ML
1 PEN KIT SUBCUTANEOUS
Qty: 1 | Refills: 11 | OUTPATIENT
Start: 2022-02-01 | End: 2022-02-13

## 2021-12-06 RX ORDER — LINACLOTIDE 72 UG/1
1 CAPSULE AT LEAST 30 MINUTES BEFORE THE FIRST MEAL OF THE DAY ON AN EMPTY STOMACH CAPSULE, GELATIN COATED ORAL ONCE A DAY
Qty: 90 | Refills: 4 | Status: ACTIVE | COMMUNITY
Start: 2021-08-26 | End: 2022-11-18

## 2021-12-06 RX ORDER — HYOSCYAMINE SULFATE 0.12 MG/1
1 TABLET AS NEEDED TABLET ORAL
Qty: 120 | Refills: 3 | Status: ACTIVE | COMMUNITY
Start: 2021-08-23 | End: 2021-12-20

## 2021-12-06 RX ORDER — DICYCLOMINE HYDROCHLORIDE 10 MG/1
2 CAPSULES CAPSULE ORAL THREE TIMES A DAY
Qty: 180 | Refills: 3 | Status: ACTIVE | COMMUNITY
Start: 2021-09-03 | End: 2022-02-09

## 2021-12-06 RX ORDER — PANTOPRAZOLE SODIUM 40 MG/1
1 TABLET TABLET, DELAYED RELEASE ORAL ONCE A DAY
Qty: 30 TABLET | Refills: 11 | COMMUNITY
Start: 2021-06-25

## 2021-12-06 RX ORDER — USTEKINUMAB 90 MG/ML
AS DIRECTED INJECTION, SOLUTION SUBCUTANEOUS
Qty: 1 | Refills: 11 | OUTPATIENT
Start: 2021-12-06 | End: 2023-10-09

## 2021-12-06 RX ORDER — ONDANSETRON HYDROCHLORIDE 4 MG/1
1 TABLET TABLET, FILM COATED ORAL
Qty: 21 | Refills: 0 | COMMUNITY
Start: 2021-06-01

## 2021-12-06 RX ORDER — DICYCLOMINE HYDROCHLORIDE 10 MG/1
2 CAPSULES CAPSULE ORAL THREE TIMES A DAY
Qty: 180 | Refills: 3 | Status: ACTIVE | COMMUNITY
Start: 2021-10-12 | End: 2022-02-08

## 2021-12-06 RX ORDER — OMEPRAZOLE 40 MG/1
1 CAPSULE 30 MINUTES BEFORE MORNING MEAL CAPSULE, DELAYED RELEASE PELLETS ORAL ONCE A DAY
Qty: 90 | Refills: 4 | COMMUNITY
Start: 2021-03-25

## 2021-12-06 RX ORDER — LINACLOTIDE 145 UG/1
1 CAPSULE AT LEAST 30 MINUTES BEFORE THE FIRST MEAL OF THE DAY ON AN EMPTY STOMACH CAPSULE, GELATIN COATED ORAL ONCE A DAY
Qty: 90 | Refills: 4 | Status: ACTIVE | COMMUNITY
Start: 2021-09-03 | End: 2022-11-26

## 2021-12-06 RX ORDER — ADALIMUMAB 80MG/0.8ML
160MG WEEK 0 AND THEN 80 MG WEEK 2 KIT SUBCUTANEOUS EVERY 2 WEEKS
Qty: 3 PENS | Refills: 0 | OUTPATIENT
Start: 2022-02-01

## 2021-12-06 NOTE — HPI-TODAY'S VISIT:
Carmine Wheeler is here for evaluation of CD. . Previously on 6/1/2021, pt reports that she was discharged from hospital ([facility]) for severe abdominal pain.  She has severe nausea/vomiting and weight loss.  She is very emotional regarding the issues.  She has long term constipation and has chronic hemorrhoids with significant amount of swelling.  She takes Dulcolax daily, which helps her to have a BM.  She was discharged on bentyl/Zofran/Prilosec.  Has a lot of nausea and vomiting and has some blood on occasion.   having some mucus as well as cramping abd pain . Previously on 6/25/21, pt reports that her vomiting has improved; eating more than she used to; has been taking laxatives, which are improving her bowel function (every 3 days).  She still has having abdominal pain, diffuse in nature, nothing makes it better or worse.  Having mucus in stool, but no blood.  Does not take NSAID. . Previously on 8/23/2021, pt reports that she is having more stomach pain, still having constipation, which has improved with laxatives and supplements.  No longer having as much degree of vomiting as prior. . Today on 9/3/2021, pt reports that she was hospitalized for severe abdominal pain.  Her CT scan showed lot of stool in the colon.  Feels much better compared to prior, regular BM.  Not working currently.  re-gaining her weight. . PMH: h/o possible c diff resolved w/o tx . 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 4/21/2021 with Dr. Gamboa: The examined esophagus was normal. Findings: The Z-line was regular and was found 37 cm from the incisors. Erythematous mucosa was found in the stomach. The cardia and gastric fundus were normal on retroflexion. The examined duodenum was normal. . Colonoscopy  6/2021: Diffuse inflammation, moderate in severity and characterized by friability, granularity and aphthous ulcerations was found in the terminal ileum. Biopsies were taken with a cold forceps for histology. The pathology specimen was placed into Bottle Number 1. Mild stenosis was also seen. Findings: : Inflammation The colon (entire examined portion) appeared normal. Biopsies were taken with a cold forceps for histology. The pathology specimen was placed into Bottle Number 2. . Final Pathologic Diagnosis A Terminal ileum, biopsy Focal active ileitis. No granulomas seen. See comment. COMMENT: The ileal biopsy reveals acute inflammatory cells within villous epithelium. Changes such as these may be seen with ischemic enteritis, viruses, bacterial infections, following NSAID use, and rarely idiopathic inflammatory bowel disease.

## 2021-12-16 ENCOUNTER — OFFICE VISIT (OUTPATIENT)
Dept: URBAN - METROPOLITAN AREA SURGERY CENTER 18 | Facility: SURGERY CENTER | Age: 31
End: 2021-12-16

## 2021-12-20 ENCOUNTER — TELEPHONE ENCOUNTER (OUTPATIENT)
Dept: URBAN - METROPOLITAN AREA CLINIC 92 | Facility: CLINIC | Age: 31
End: 2021-12-20

## 2021-12-20 RX ORDER — FOLIC ACID 1 MG/1
1 TABLET TABLET ORAL ONCE A DAY
Qty: 30 | Refills: 11 | OUTPATIENT
Start: 2021-12-20 | End: 2022-12-15

## 2021-12-20 RX ORDER — METHOTREXATE SODIUM 2.5 MG/1
3 TAB TABLET ORAL WEEKLY
Qty: 12 | Refills: 4 | OUTPATIENT
Start: 2021-12-20

## 2022-01-04 ENCOUNTER — TELEPHONE ENCOUNTER (OUTPATIENT)
Dept: URBAN - METROPOLITAN AREA CLINIC 96 | Facility: CLINIC | Age: 32
End: 2022-01-04

## 2022-01-11 ENCOUNTER — TELEPHONE ENCOUNTER (OUTPATIENT)
Dept: URBAN - METROPOLITAN AREA CLINIC 98 | Facility: CLINIC | Age: 32
End: 2022-01-11

## 2022-02-01 ENCOUNTER — TELEPHONE ENCOUNTER (OUTPATIENT)
Dept: URBAN - METROPOLITAN AREA CLINIC 92 | Facility: CLINIC | Age: 32
End: 2022-02-01

## 2022-02-01 PROBLEM — 14760008 CONSTIPATION: Status: ACTIVE | Noted: 2021-06-25

## 2022-02-01 PROBLEM — 71833008 CROHN'S DISEASE OF SMALL AND LARGE INTESTINES: Status: ACTIVE | Noted: 2021-12-06

## 2022-03-04 ENCOUNTER — TELEPHONE ENCOUNTER (OUTPATIENT)
Dept: URBAN - METROPOLITAN AREA CLINIC 92 | Facility: CLINIC | Age: 32
End: 2022-03-04

## 2022-03-11 ENCOUNTER — TELEPHONE ENCOUNTER (OUTPATIENT)
Dept: URBAN - METROPOLITAN AREA CLINIC 96 | Facility: CLINIC | Age: 32
End: 2022-03-11

## 2022-05-13 ENCOUNTER — TELEPHONE ENCOUNTER (OUTPATIENT)
Dept: URBAN - METROPOLITAN AREA CLINIC 96 | Facility: CLINIC | Age: 32
End: 2022-05-13